# Patient Record
Sex: MALE | Race: WHITE | URBAN - METROPOLITAN AREA
[De-identification: names, ages, dates, MRNs, and addresses within clinical notes are randomized per-mention and may not be internally consistent; named-entity substitution may affect disease eponyms.]

---

## 2020-08-26 ENCOUNTER — OP HISTORICAL/CONVERTED ENCOUNTER (OUTPATIENT)
Dept: OTHER | Age: 25
End: 2020-08-26

## 2021-01-08 ENCOUNTER — OFFICE VISIT (OUTPATIENT)
Dept: FAMILY MEDICINE CLINIC | Age: 26
End: 2021-01-08
Payer: COMMERCIAL

## 2021-01-08 VITALS
TEMPERATURE: 98.3 F | DIASTOLIC BLOOD PRESSURE: 87 MMHG | SYSTOLIC BLOOD PRESSURE: 122 MMHG | HEART RATE: 90 BPM | OXYGEN SATURATION: 97 %

## 2021-01-08 DIAGNOSIS — Z20.822 SUSPECTED COVID-19 VIRUS INFECTION: Primary | ICD-10-CM

## 2021-01-08 DIAGNOSIS — R09.81 NASAL CONGESTION: ICD-10-CM

## 2021-01-08 DIAGNOSIS — H65.91 FLUID LEVEL BEHIND TYMPANIC MEMBRANE OF RIGHT EAR: ICD-10-CM

## 2021-01-08 DIAGNOSIS — Z20.822 SUSPECTED COVID-19 VIRUS INFECTION: ICD-10-CM

## 2021-01-08 PROCEDURE — 99213 OFFICE O/P EST LOW 20 MIN: CPT | Performed by: NURSE PRACTITIONER

## 2021-01-08 NOTE — PROGRESS NOTES
HPI  Mellisa Bautista is a 23 y.o. male and presents today for Cold Symptoms  PCP Edu  Symptoms x 2 days. He is not specifically concerned for covid but would like to be swabbed. Recent Travel Screening and Travel History documentation   Travel Screening     Question   Response    In the last month, have you been in contact with someone who was confirmed or suspected to have Coronavirus / COVID-19? No / Unsure    Have you had a COVID-19 viral test in the last 14 days? No    Do you have any of the following new or worsening symptoms? Fever;Cough; Loss of taste;Runny nose; Loss of smell    Have you traveled internationally in the last month? No      Travel History   Travel since 12/08/20     No documented travel since 12/08/20          Screening  On the basis of positive PHQ-9 screening ( ), C-SSRS completed. Patient will follow-up as needed/as directed with PCP. Allergies  Not on File     Medications  No current outpatient medications on file. No current facility-administered medications for this visit. Problem List  There are no active problems to display for this patient. Vitals  Visit Vitals  /87   Pulse 90   Temp 98.3 °F (36.8 °C)   SpO2 97%        ROS  Review of Systems   Constitutional: Positive for fever. Negative for chills and malaise/fatigue. HENT: Positive for congestion. Negative for ear pain, sinus pain and sore throat. Loss of taste/smell   Eyes: Negative for blurred vision and redness. Respiratory: Positive for cough. Negative for sputum production, shortness of breath and wheezing. Cardiovascular: Negative for chest pain, palpitations and leg swelling. Gastrointestinal: Negative for abdominal pain, constipation, diarrhea, heartburn, nausea and vomiting. Genitourinary: Negative for dysuria and hematuria. Musculoskeletal: Negative for falls, joint pain and myalgias. Skin: Negative for rash.    Neurological: Negative for dizziness, seizures, weakness and headaches. Endo/Heme/Allergies: Does not bruise/bleed easily. Psychiatric/Behavioral: Negative for depression and suicidal ideas. The patient does not have insomnia. Physical Exam  Physical Exam     Assessment/Plan  1. Suspected COVID-19 virus infection  Instructed patient to self quarantine; answered all questions regarding this/covid. If Covid testing was performed today, Will call with test results as soon as they are available; recommend otc tylenol prn for fever/pain; also recommend vit D/vit C/zinc/b12 daily and melatonin for sleep; discussed deep breathing exercises to be done at home; advised for worsening symptoms, sob, difficulty breathing or any other concerning symptoms, instructed patient to go to nearest ER   - NOVEL CORONAVIRUS (COVID-19); Future    2. Nasal congestion  Supportive care discussed with patient; recommend flonase and zyrtec otc    3. Fluid level behind tympanic membrane of right ear  Recommend he start flonase       Reviewed with him/her that COVID-19 pandemic is an evolving situation with rapidly changing recommendations & guidelines. Medical decisions are made based on the the best information available at the time. Recommended he/she stay tuned for updates published by trusted sources and to advise your PCP of any unexpected changes in clinical condition    Chiara Gates, JOHNSON, FNP-BC       This visit was provided as a focused evaluation during the COVID -19 pandemic/national emergency. A comprehensive review of all previous patient history and testing was not conducted. Pertinent findings were elicited during the visit.

## 2021-01-10 LAB — SARS-COV-2, NAA: NOT DETECTED
